# Patient Record
Sex: FEMALE | Race: ASIAN | HISPANIC OR LATINO | URBAN - METROPOLITAN AREA
[De-identification: names, ages, dates, MRNs, and addresses within clinical notes are randomized per-mention and may not be internally consistent; named-entity substitution may affect disease eponyms.]

---

## 2023-08-09 ENCOUNTER — OFFICE (OUTPATIENT)
Dept: URBAN - METROPOLITAN AREA CLINIC 67 | Facility: CLINIC | Age: 48
End: 2023-08-09
Payer: COMMERCIAL

## 2023-08-09 VITALS
HEART RATE: 71 BPM | DIASTOLIC BLOOD PRESSURE: 86 MMHG | SYSTOLIC BLOOD PRESSURE: 135 MMHG | OXYGEN SATURATION: 98 % | HEIGHT: 60 IN | WEIGHT: 143 LBS

## 2023-08-09 DIAGNOSIS — R10.13 EPIGASTRIC PAIN: ICD-10-CM

## 2023-08-09 DIAGNOSIS — K64.9 UNSPECIFIED HEMORRHOIDS: ICD-10-CM

## 2023-08-09 DIAGNOSIS — Z86.19 PERSONAL HISTORY OF OTHER INFECTIOUS AND PARASITIC DISEASES: ICD-10-CM

## 2023-08-09 DIAGNOSIS — K62.89 OTHER SPECIFIED DISEASES OF ANUS AND RECTUM: ICD-10-CM

## 2023-08-09 PROCEDURE — 99204 OFFICE O/P NEW MOD 45 MIN: CPT

## 2023-08-09 RX ORDER — SUCRALFATE 1 G/1
TABLET ORAL
Qty: 56 | Refills: 0 | Status: ACTIVE

## 2023-08-09 RX ORDER — OMEPRAZOLE 40 MG/1
CAPSULE, DELAYED RELEASE ORAL
Qty: 30 | Refills: 0 | Status: ACTIVE

## 2023-08-09 NOTE — SERVICEHPINOTES
Harika Fuller   is seen for an initial visit today.    brPatient referred from [facility] for GERD, epigastric pain. She was treated with triple therapy in the fall for a positive h pylori serology. She reports having a negatiev FIT and h pylori stool test after treatment. she stopped omeprazole for fear of liver issues. 
br   She reports having no nausea, vomiting, and a good appetite. She reports having worse belching and pain in the morning
br BMs are soft, daily most days, but rectal pain after BMs at times. Using cream PRN. 
br she reports having blood on toilet paper in the fall, none now. Had a negative FIT in the fall when she tested for h pylori. No previous CRC screening otherwise.  No

## 2023-09-15 ENCOUNTER — ON CAMPUS - OUTPATIENT (OUTPATIENT)
Dept: URBAN - METROPOLITAN AREA MEDICAL CENTER 2 | Facility: MEDICAL CENTER | Age: 48
End: 2023-09-15
Payer: COMMERCIAL

## 2023-09-15 VITALS — HEIGHT: 60 IN

## 2023-09-15 DIAGNOSIS — Z12.11 ENCOUNTER FOR SCREENING FOR MALIGNANT NEOPLASM OF COLON: ICD-10-CM

## 2023-09-15 DIAGNOSIS — K31.89 OTHER DISEASES OF STOMACH AND DUODENUM: ICD-10-CM

## 2023-09-15 PROCEDURE — 43239 EGD BIOPSY SINGLE/MULTIPLE: CPT | Mod: 51 | Performed by: SPECIALIST

## 2023-09-15 PROCEDURE — G0121 COLON CA SCRN NOT HI RSK IND: HCPCS | Performed by: SPECIALIST
